# Patient Record
Sex: MALE | Race: WHITE | NOT HISPANIC OR LATINO | Employment: FULL TIME | URBAN - METROPOLITAN AREA
[De-identification: names, ages, dates, MRNs, and addresses within clinical notes are randomized per-mention and may not be internally consistent; named-entity substitution may affect disease eponyms.]

---

## 2019-07-13 ENCOUNTER — HOSPITAL ENCOUNTER (EMERGENCY)
Facility: CLINIC | Age: 49
Discharge: HOME | End: 2019-07-13
Attending: EMERGENCY MEDICINE | Admitting: EMERGENCY MEDICINE
Payer: COMMERCIAL

## 2019-07-13 ENCOUNTER — APPOINTMENT (OUTPATIENT)
Dept: GENERAL RADIOLOGY | Facility: CLINIC | Age: 49
End: 2019-07-13
Attending: EMERGENCY MEDICINE
Payer: COMMERCIAL

## 2019-07-13 VITALS
HEIGHT: 69 IN | TEMPERATURE: 99.5 F | WEIGHT: 170 LBS | SYSTOLIC BLOOD PRESSURE: 122 MMHG | RESPIRATION RATE: 16 BRPM | HEART RATE: 90 BPM | OXYGEN SATURATION: 96 % | BODY MASS INDEX: 25.18 KG/M2 | DIASTOLIC BLOOD PRESSURE: 81 MMHG

## 2019-07-13 DIAGNOSIS — R07.89 CHEST DISCOMFORT: ICD-10-CM

## 2019-07-13 DIAGNOSIS — E87.6 HYPOKALEMIA: ICD-10-CM

## 2019-07-13 LAB
ANION GAP SERPL CALCULATED.3IONS-SCNC: 9 MMOL/L (ref 3–14)
BASOPHILS # BLD AUTO: 0 10E9/L (ref 0–0.2)
BASOPHILS NFR BLD AUTO: 0.2 %
BUN SERPL-MCNC: 16 MG/DL (ref 7–30)
CALCIUM SERPL-MCNC: 8.9 MG/DL (ref 8.5–10.1)
CHLORIDE SERPL-SCNC: 92 MMOL/L (ref 94–109)
CO2 SERPL-SCNC: 31 MMOL/L (ref 20–32)
CREAT SERPL-MCNC: 0.94 MG/DL (ref 0.66–1.25)
DIFFERENTIAL METHOD BLD: ABNORMAL
EOSINOPHIL # BLD AUTO: 0 10E9/L (ref 0–0.7)
EOSINOPHIL NFR BLD AUTO: 0.3 %
ERYTHROCYTE [DISTWIDTH] IN BLOOD BY AUTOMATED COUNT: 12.1 % (ref 10–15)
GFR SERPL CREATININE-BSD FRML MDRD: >90 ML/MIN/{1.73_M2}
GLUCOSE SERPL-MCNC: 132 MG/DL (ref 70–99)
HCT VFR BLD AUTO: 43 % (ref 40–53)
HGB BLD-MCNC: 15.3 G/DL (ref 13.3–17.7)
IMM GRANULOCYTES # BLD: 0.1 10E9/L (ref 0–0.4)
IMM GRANULOCYTES NFR BLD: 0.6 %
INTERPRETATION ECG - MUSE: NORMAL
LYMPHOCYTES # BLD AUTO: 1.4 10E9/L (ref 0.8–5.3)
LYMPHOCYTES NFR BLD AUTO: 11.2 %
MCH RBC QN AUTO: 35.1 PG (ref 26.5–33)
MCHC RBC AUTO-ENTMCNC: 35.6 G/DL (ref 31.5–36.5)
MCV RBC AUTO: 99 FL (ref 78–100)
MONOCYTES # BLD AUTO: 0.8 10E9/L (ref 0–1.3)
MONOCYTES NFR BLD AUTO: 6.9 %
NEUTROPHILS # BLD AUTO: 9.8 10E9/L (ref 1.6–8.3)
NEUTROPHILS NFR BLD AUTO: 80.8 %
NRBC # BLD AUTO: 0 10*3/UL
NRBC BLD AUTO-RTO: 0 /100
PLATELET # BLD AUTO: 155 10E9/L (ref 150–450)
POTASSIUM SERPL-SCNC: 2.9 MMOL/L (ref 3.4–5.3)
RBC # BLD AUTO: 4.36 10E12/L (ref 4.4–5.9)
SODIUM SERPL-SCNC: 132 MMOL/L (ref 133–144)
TROPONIN I SERPL-MCNC: <0.015 UG/L (ref 0–0.04)
WBC # BLD AUTO: 12.1 10E9/L (ref 4–11)

## 2019-07-13 PROCEDURE — 71046 X-RAY EXAM CHEST 2 VIEWS: CPT

## 2019-07-13 PROCEDURE — 85025 COMPLETE CBC W/AUTO DIFF WBC: CPT | Performed by: EMERGENCY MEDICINE

## 2019-07-13 PROCEDURE — 25000128 H RX IP 250 OP 636: Performed by: EMERGENCY MEDICINE

## 2019-07-13 PROCEDURE — 80048 BASIC METABOLIC PNL TOTAL CA: CPT | Performed by: EMERGENCY MEDICINE

## 2019-07-13 PROCEDURE — 84484 ASSAY OF TROPONIN QUANT: CPT | Performed by: EMERGENCY MEDICINE

## 2019-07-13 PROCEDURE — 25000132 ZZH RX MED GY IP 250 OP 250 PS 637: Performed by: EMERGENCY MEDICINE

## 2019-07-13 PROCEDURE — 93005 ELECTROCARDIOGRAM TRACING: CPT

## 2019-07-13 PROCEDURE — 99285 EMERGENCY DEPT VISIT HI MDM: CPT | Mod: 25

## 2019-07-13 PROCEDURE — 96365 THER/PROPH/DIAG IV INF INIT: CPT

## 2019-07-13 RX ORDER — POTASSIUM CHLORIDE 1500 MG/1
40 TABLET, EXTENDED RELEASE ORAL ONCE
Status: COMPLETED | OUTPATIENT
Start: 2019-07-13 | End: 2019-07-13

## 2019-07-13 RX ORDER — MAGNESIUM SULFATE HEPTAHYDRATE 40 MG/ML
2 INJECTION, SOLUTION INTRAVENOUS ONCE
Status: COMPLETED | OUTPATIENT
Start: 2019-07-13 | End: 2019-07-13

## 2019-07-13 RX ADMIN — POTASSIUM CHLORIDE 40 MEQ: 1500 TABLET, EXTENDED RELEASE ORAL at 16:29

## 2019-07-13 RX ADMIN — MAGNESIUM SULFATE HEPTAHYDRATE 2 G: 40 INJECTION, SOLUTION INTRAVENOUS at 16:44

## 2019-07-13 ASSESSMENT — ENCOUNTER SYMPTOMS
BACK PAIN: 1
ARTHRALGIAS: 1
WEAKNESS: 1
DIARRHEA: 0
ABDOMINAL PAIN: 0
VOMITING: 1

## 2019-07-13 ASSESSMENT — MIFFLIN-ST. JEOR: SCORE: 1621.49

## 2019-07-13 NOTE — ED PROVIDER NOTES
"  History     Chief Complaint:  Arm and back pain    HPI     Phong Atkins is a 48 year old male who presents to the emergency department for evaluation of left arm and back pain. The patient reports that last night he felt an abnormal \"zapping\" sensation in his chest near his heart. He then reported having an abnormal left arm sensation. The next morning he reports feeling arm and leg weakness that progressed throughout the day while packing for a trip. He also reports feeling upper back pain, pain near his left armpit, and chest discomfort. Of note the patient reports drinking beginning on  7/6 and lasting through 7/9. On 7/10, he reports vomiting frequently, feeling shaky and having a decreased appetite. He currently denies any abdominal pain or diarrhea.    Cardiac/PE/DVT Risk Factors:  History of hypertension - negative  History of hyperlipidemia - negative  History of diabetes - negative  History of smoking - negative  Personal history of PE/DVT - negative  Family history of PE/DVT - negative  Family history of heart complications - Yes, father   Recent travel - negative  Recent surgery - negative  Other immobilizations - negative  Cancer - negative    Allergies:  No Known Drug Allergies    Medications:    The patient is not currently taking any prescribed medications.    Past Medical History:    The patient denies any significant past medical history.     Past Surgical History:    The patient does not have any pertinent past surgical history.    Family History:    No past pertinent family history.    Social History:  The patient was accompanied to the ED by his children.  Smoking Status: Not on file  Alcohol Use: Not on file  Drug Use: Not one file    Review of Systems   Gastrointestinal: Positive for vomiting. Negative for abdominal pain and diarrhea.   Musculoskeletal: Positive for arthralgias (left arm) and back pain.   Neurological: Positive for weakness.   All other systems reviewed and are " "negative.      Physical Exam   Vitals:  Patient Vitals for the past 24 hrs:   BP Temp Temp src Pulse Resp SpO2 Height Weight   07/13/19 1631 122/81 -- -- 90 -- 96 % -- --   07/13/19 1600 -- -- -- 86 16 99 % -- --   07/13/19 1445 -- -- -- -- -- 98 % -- --   07/13/19 1444 (!) 141/97 99.5  F (37.5  C) Oral 126 16 -- 1.753 m (5' 9\") 77.1 kg (170 lb)     Physical Exam   General: Alert and cooperative with exam. Patient in mild distress. Normal mentation. Anxious on exam  Head:  Scalp is NC/AT  Eyes:  No scleral icterus, PERRL  ENT:  The external nose and ears are normal. The oropharynx is normal and without erythema; mucus membranes are moist. Uvula midline, no evidence of deep space infection.  Neck:  Normal range of motion without rigidity.  CV:  Regular rate and rhythm    No pathologic murmur   Resp:  Breath sounds are clear bilaterally    Non-labored, no retractions or accessory muscle use  GI:  Abdomen is soft, no distension, no tenderness. No peritoneal signs  MS:  No lower extremity edema. Left upper extremity CMS intact, no pain to palpation.  Skin:  Warm and dry, No rash or lesions noted.  Neuro: Oriented x 3. No gross motor deficits.    Emergency Department Course   ECG:  Completed at 1448.  Read at 1539.   Rate 115 bpm. CT interval 170. QRS duration 104. QT/QTc 332/459. P-R-T axes 42 -67 37.  Sinus tachycardia with occasional premature ventricular complexes  Left anterior fascicular block  Abnormal ECG    Imaging:  Radiographic findings were communicated with the patient who voiced understanding of the findings.    XR Chest, PA & LAT  No acute disease.  Reading per radiology.    Laboratory:  CBC: WBC 12.1 (H) o/w AWNL (HGB 15.3, )  BMP:  (L) Potassium 2.9 (L) Glucose 132 (H) o/w AWNL (Creatinine 0.94)    Troponin (Collected 1530): 0.015    Interventions:  1629 Potassium chloride 40 mEq PO  1644 Magnesium sulfate 2 g IV    Emergency Department Course:  Nursing notes and vitals reviewed. 1540 I " performed an exam of the patient as documented above.     IV inserted. Medicine administered as documented above. Blood drawn. This was sent to the lab for further testing, results above.    The patient was sent for a chest x-ray while in the emergency department, findings above.     1722 I rechecked the patient and discussed the results of his workup thus far.     Findings and plan explained to the Patient. Patient discharged home with instructions regarding supportive care, medications, and reasons to return. The importance of close follow-up was reviewed.     I personally reviewed the laboratory results with the Patient and answered all related questions prior to discharge.    Impression & Plan      Medical Decision Making:  Patient is a 48-year-old male presents with episode of chest discomfort and recent drinking binge with multiple episodes of emesis (now resolved).  Patient's medical history and records were reviewed.  Initial consideration for, not limited to, atypical ACS/MI, esophageal spasm/GERD, MSK pain, electrolyte abnormality, anxiety, esophageal pathology, among others.  Labs, EKG, and imaging was obtained.  Chest x-ray unremarkable.  EKG demonstrates mild sinus tachycardia which resolved with administration of IV fluids; likely element of dehydration as well as anxiety.  Labs notable for hypokalemia (potassium 2.9; provided oral potassium replacement as well as empiric magnesium replacement).  Patient was offered but deferred resources for alcohol abuse.  Patient's symptoms are likely secondary to hypokalemia, possible Etoh withdrawal (now resolved), and anxiety.  Recommended close follow-up with PCP for recheck potassium.  Recommended dietary supplementation.  Return precautions discussed.  Patient discharged home.  Time discharge patient was hemodynamically stable, neurologic intact, afebrile, and asymptomatic.    Diagnosis:    ICD-10-CM    1. Hypokalemia E87.6    2. Chest discomfort R07.89         Disposition:  discharged to home    I, Julio Jean, am serving as a scribe on 7/13/2019 at 4:10 PM to personally document services performed by Ermias Tracey DO based on my observations and the provider's statements to me.       Julio Jean  7/13/2019    EMERGENCY DEPARTMENT       Ermias Tracey DO  07/15/19 0059

## 2019-07-13 NOTE — ED TRIAGE NOTES
Pt reports left arm and back pain starting yesterday. Pt reports that he feel generalized weakness.

## 2020-03-11 ENCOUNTER — HEALTH MAINTENANCE LETTER (OUTPATIENT)
Age: 50
End: 2020-03-11

## 2020-03-17 ENCOUNTER — OFFICE VISIT (OUTPATIENT)
Dept: URGENT CARE | Facility: URGENT CARE | Age: 50
End: 2020-03-17
Payer: COMMERCIAL

## 2020-03-17 VITALS
DIASTOLIC BLOOD PRESSURE: 87 MMHG | HEART RATE: 124 BPM | SYSTOLIC BLOOD PRESSURE: 121 MMHG | TEMPERATURE: 97.1 F | WEIGHT: 147 LBS | OXYGEN SATURATION: 98 % | BODY MASS INDEX: 21.71 KG/M2

## 2020-03-17 DIAGNOSIS — R10.13 ABDOMINAL PAIN, EPIGASTRIC: Primary | ICD-10-CM

## 2020-03-17 DIAGNOSIS — K29.00 ACUTE GASTRITIS WITHOUT HEMORRHAGE, UNSPECIFIED GASTRITIS TYPE: ICD-10-CM

## 2020-03-17 LAB
BASOPHILS # BLD AUTO: 0 10E9/L (ref 0–0.2)
BASOPHILS NFR BLD AUTO: 0.3 %
DIFFERENTIAL METHOD BLD: ABNORMAL
EOSINOPHIL # BLD AUTO: 0.1 10E9/L (ref 0–0.7)
EOSINOPHIL NFR BLD AUTO: 0.9 %
ERYTHROCYTE [DISTWIDTH] IN BLOOD BY AUTOMATED COUNT: 12.8 % (ref 10–15)
HCT VFR BLD AUTO: 42 % (ref 40–53)
HGB BLD-MCNC: 14.1 G/DL (ref 13.3–17.7)
LYMPHOCYTES # BLD AUTO: 1.8 10E9/L (ref 0.8–5.3)
LYMPHOCYTES NFR BLD AUTO: 16.2 %
MCH RBC QN AUTO: 33.7 PG (ref 26.5–33)
MCHC RBC AUTO-ENTMCNC: 33.6 G/DL (ref 31.5–36.5)
MCV RBC AUTO: 100 FL (ref 78–100)
MONOCYTES # BLD AUTO: 1.7 10E9/L (ref 0–1.3)
MONOCYTES NFR BLD AUTO: 16 %
NEUTROPHILS # BLD AUTO: 7.3 10E9/L (ref 1.6–8.3)
NEUTROPHILS NFR BLD AUTO: 66.6 %
PLATELET # BLD AUTO: 133 10E9/L (ref 150–450)
RBC # BLD AUTO: 4.19 10E12/L (ref 4.4–5.9)
WBC # BLD AUTO: 10.9 10E9/L (ref 4–11)

## 2020-03-17 PROCEDURE — 82150 ASSAY OF AMYLASE: CPT | Performed by: NURSE PRACTITIONER

## 2020-03-17 PROCEDURE — 36415 COLL VENOUS BLD VENIPUNCTURE: CPT | Performed by: NURSE PRACTITIONER

## 2020-03-17 PROCEDURE — 99203 OFFICE O/P NEW LOW 30 MIN: CPT | Performed by: NURSE PRACTITIONER

## 2020-03-17 PROCEDURE — 80053 COMPREHEN METABOLIC PANEL: CPT | Performed by: NURSE PRACTITIONER

## 2020-03-17 PROCEDURE — 85025 COMPLETE CBC W/AUTO DIFF WBC: CPT | Performed by: NURSE PRACTITIONER

## 2020-03-17 RX ORDER — PANTOPRAZOLE SODIUM 40 MG/1
40 TABLET, DELAYED RELEASE ORAL DAILY
Qty: 30 TABLET | Refills: 0 | Status: SHIPPED | OUTPATIENT
Start: 2020-03-17 | End: 2020-04-16

## 2020-03-17 SDOH — HEALTH STABILITY: MENTAL HEALTH: HOW OFTEN DO YOU HAVE A DRINK CONTAINING ALCOHOL?: MONTHLY OR LESS

## 2020-03-17 ASSESSMENT — ENCOUNTER SYMPTOMS
NEUROLOGICAL NEGATIVE: 1
NAUSEA: 1
ABDOMINAL PAIN: 1
MUSCULOSKELETAL NEGATIVE: 1
RESPIRATORY NEGATIVE: 1
CONSTITUTIONAL NEGATIVE: 1
CARDIOVASCULAR NEGATIVE: 1

## 2020-03-17 NOTE — PATIENT INSTRUCTIONS
Patient Education     Gastritis or Ulcer, No Antibiotic Treatment    Gastritis is irritation and inflammation of the stomach lining. This means the lining is red and swollen. It can cause shallow sores in the stomach lining called erosions. An ulcer is a deeper open sore in the lining of the stomach. It may also occur in the first part of the small intestine (duodenum). The causes and symptoms of gastritis and ulcers are very similar.  Causes and risk factors for both problems can include:    Long-term use of nonsteroidal anti-inflammatory drugs (NSAIDs), such as aspirin and ibuprofen    H. pylori bacteria infection    Tobacco use    Alcohol use    Certain other conditions such as immune disorders, certain medicines (high-dose iron supplements) and street drugs (such as cocaine)  Symptoms for both problems can include:    Dull or burning pain in the upper part of the belly    Loss of appetite    Heartburn or upset stomach    Frequent burping    Bloated feeling    Nausea with or without vomiting  You likely had an evaluation to help find the exact cause and extent of your problem. This may have included a health history, exam, and certain tests.  Results showed that your problem is not due to H. pylori infection. For this reason, you don't need antibiotics as part of your treatment.  Whether your problem is gastritis or an ulcer, you will still need to take other medicines, however. You will also need to follow instructions to help reduce stomach irritation so your stomach can heal.   Home care    Take any medicines you re prescribed exactly as directed. Common medicines used to treat gastritis include:  ? Antacids. These help neutralize the normal acids in your stomach.  ? Proton pump inhibitors. These block your stomach from making any acid.  ? H2 blockers. These reduce the amount of acid your stomach makes.  ? Bismuth subsalicylate. This helps protect the lining of your stomach from acid.    Don't take  any NSAIDs during your treatment. If you take NSAID to help treat other health problems, tell your healthcare provider. He or she may need to adjust your medicine plan or change the dosage.    Don t use tobacco. Also don t drink alcohol. These products can increase the amount of acid your stomach makes. This can delay healing. It can also worsen symptoms.  Follow-up care  Follow up with your healthcare provider, or as advised. In some cases, more testing may be needed.  When to seek medical advice  Call your healthcare provider right away if any of these occur:    Fever of 100.4 F (38 C) or higher, or as directed by your healthcare provider    Stomach pain that worsens or moves to the lower right part of belly    Extreme fatigue    Weakness or dizziness    Continued weight loss    Frequent vomiting, blood in your vomit, or coffee ground-like substance in your vomit    Black, tarry, or bloody stools  Call 911  Call 911 if any of these occur:    Chest pain appears or worsens, or spreads to the back, neck, shoulder, or arm    Unusually fast heart rate    Trouble breathing or swallowing    Confusion    Extreme drowsiness or trouble waking up    Fainting    Large amounts of blood present in vomit or stool  Date Last Reviewed: 3/1/2018    7740-0327 [employer]. [address] [place] [address], [place] [address]. All rights reserved. This information is not intended as a substitute for professional medical care. Always follow your healthcare professional's instructions.

## 2020-03-17 NOTE — PROGRESS NOTES
HPI  Phong Atkins 49 year old presents to the urgent care with intermittent epigastric pain.  He reports that he went on a drinking binge last week and had his last drink on Thursday which is 5 days ago.  He reports that since that time it has been painful and when he drinks it also activates that painful symptoms in his epigastrium.  He had one episode of vomiting last Thursday but none since.  He has been having normal bowel movements and has not noted that they were particularly dark or that he had any matthew blood.  He has started taking Mylanta and finds that that affords him some relief from his symptoms.  He denies that he had withdrawal symptoms after this episode of drinking but has not resumed solid food and has been just taking shakes as these are the easiest to digest.    Review of Systems   Constitutional: Negative.    HENT: Negative.    Respiratory: Negative.    Cardiovascular: Negative.    Gastrointestinal: Positive for abdominal pain and nausea.   Musculoskeletal: Negative.    Skin: Negative.    Neurological: Negative.    Endo/Heme/Allergies: Negative.       Past Medical History:   Diagnosis Date     Alcohol abuse      There is no problem list on file for this patient.     History reviewed. No pertinent surgical history.  No Known Allergies  Current Outpatient Medications   Medication     pantoprazole (PROTONIX) 40 MG EC tablet     No current facility-administered medications for this visit.          Physical Exam  Vitals signs and nursing note reviewed.   Constitutional:       General: He is not in acute distress.     Comments: Blood pressure 121/87, pulse 124, temperature 97.1  F (36.2  C), temperature source Oral, weight 66.7 kg (147 lb), SpO2 98 %.  s   HENT:      Head: Normocephalic.      Mouth/Throat:      Mouth: Mucous membranes are moist.   Eyes:      General: No scleral icterus.     Conjunctiva/sclera: Conjunctivae normal.   Neck:      Musculoskeletal: Normal range of motion.    Cardiovascular:      Rate and Rhythm: Normal rate.   Pulmonary:      Effort: Pulmonary effort is normal.      Breath sounds: Normal breath sounds.   Abdominal:      Palpations: There is no mass.      Tenderness: There is no abdominal tenderness. There is no right CVA tenderness, left CVA tenderness, guarding or rebound.      Hernia: No hernia is present.   Skin:     General: Skin is warm and dry.      Capillary Refill: Capillary refill takes less than 2 seconds.   Neurological:      Mental Status: He is alert and oriented to person, place, and time.   Psychiatric:         Behavior: Behavior normal.       Results for orders placed or performed in visit on 03/17/20   CBC with platelets and differential     Status: Abnormal   Result Value Ref Range    WBC 10.9 4.0 - 11.0 10e9/L    RBC Count 4.19 (L) 4.4 - 5.9 10e12/L    Hemoglobin 14.1 13.3 - 17.7 g/dL    Hematocrit 42.0 40.0 - 53.0 %     78 - 100 fl    MCH 33.7 (H) 26.5 - 33.0 pg    MCHC 33.6 31.5 - 36.5 g/dL    RDW 12.8 10.0 - 15.0 %    Platelet Count 133 (L) 150 - 450 10e9/L    % Neutrophils 66.6 %    % Lymphocytes 16.2 %    % Monocytes 16.0 %    % Eosinophils 0.9 %    % Basophils 0.3 %    Absolute Neutrophil 7.3 1.6 - 8.3 10e9/L    Absolute Lymphocytes 1.8 0.8 - 5.3 10e9/L    Absolute Monocytes 1.7 (H) 0.0 - 1.3 10e9/L    Absolute Eosinophils 0.1 0.0 - 0.7 10e9/L    Absolute Basophils 0.0 0.0 - 0.2 10e9/L    Diff Method Automated Method      Assessment:  1. Abdominal pain, epigastric    2. Acute gastritis without hemorrhage, unspecified gastritis type        Plan:  Orders Placed This Encounter     CBC with platelets and differential     Comprehensive metabolic panel (BMP + Alb, Alk Phos, ALT, AST, Total. Bili, TP)     Amylase     pantoprazole (PROTONIX) 40 MG EC tablet   CMP and Amylase pending, Differential is pending  No NSAIDs  Maalox liquid 30 ml QID, start Protonix today  Frequent small meals, reintroduce carbohydrates to the diet  Instructions  regarding self-care of patient/child reviewed.   Written instructions provided in after visit summary and reviewed.  Patient instructed to see primary care provider for new or persistent symptoms.   Red flag symptoms reviewed and patient has been instructed to seek   emergent care  Please contact pharmacy for medication questions.  Patient instructed to take medications as directed on package.      Breann Joshi, DNP, APRN, CNP

## 2020-03-18 LAB
ALBUMIN SERPL-MCNC: 3.7 G/DL (ref 3.4–5)
ALP SERPL-CCNC: 87 U/L (ref 40–150)
ALT SERPL W P-5'-P-CCNC: 45 U/L (ref 0–70)
AMYLASE SERPL-CCNC: 43 U/L (ref 30–110)
ANION GAP SERPL CALCULATED.3IONS-SCNC: 6 MMOL/L (ref 3–14)
AST SERPL W P-5'-P-CCNC: 31 U/L (ref 0–45)
BILIRUB SERPL-MCNC: 0.9 MG/DL (ref 0.2–1.3)
BUN SERPL-MCNC: 27 MG/DL (ref 7–30)
CALCIUM SERPL-MCNC: 9.1 MG/DL (ref 8.5–10.1)
CHLORIDE SERPL-SCNC: 101 MMOL/L (ref 94–109)
CO2 SERPL-SCNC: 26 MMOL/L (ref 20–32)
CREAT SERPL-MCNC: 1.18 MG/DL (ref 0.66–1.25)
GFR SERPL CREATININE-BSD FRML MDRD: 71 ML/MIN/{1.73_M2}
GLUCOSE SERPL-MCNC: 111 MG/DL (ref 70–99)
POTASSIUM SERPL-SCNC: 3.9 MMOL/L (ref 3.4–5.3)
PROT SERPL-MCNC: 7.8 G/DL (ref 6.8–8.8)
SODIUM SERPL-SCNC: 133 MMOL/L (ref 133–144)

## 2021-01-03 ENCOUNTER — HEALTH MAINTENANCE LETTER (OUTPATIENT)
Age: 51
End: 2021-01-03

## 2021-04-25 ENCOUNTER — HEALTH MAINTENANCE LETTER (OUTPATIENT)
Age: 51
End: 2021-04-25

## 2021-10-10 ENCOUNTER — HEALTH MAINTENANCE LETTER (OUTPATIENT)
Age: 51
End: 2021-10-10

## 2022-05-21 ENCOUNTER — HEALTH MAINTENANCE LETTER (OUTPATIENT)
Age: 52
End: 2022-05-21

## 2022-09-18 ENCOUNTER — HEALTH MAINTENANCE LETTER (OUTPATIENT)
Age: 52
End: 2022-09-18

## 2023-06-04 ENCOUNTER — HEALTH MAINTENANCE LETTER (OUTPATIENT)
Age: 53
End: 2023-06-04

## 2024-10-15 ENCOUNTER — HOSPITAL ENCOUNTER (EMERGENCY)
Facility: CLINIC | Age: 54
Discharge: HOME | End: 2024-10-15
Attending: EMERGENCY MEDICINE | Admitting: EMERGENCY MEDICINE
Payer: COMMERCIAL

## 2024-10-15 VITALS
DIASTOLIC BLOOD PRESSURE: 77 MMHG | OXYGEN SATURATION: 97 % | HEART RATE: 96 BPM | TEMPERATURE: 97.4 F | RESPIRATION RATE: 15 BRPM | SYSTOLIC BLOOD PRESSURE: 120 MMHG

## 2024-10-15 DIAGNOSIS — F10.920 ALCOHOLIC INTOXICATION WITHOUT COMPLICATION (H): ICD-10-CM

## 2024-10-15 DIAGNOSIS — F10.10 ALCOHOL ABUSE: ICD-10-CM

## 2024-10-15 PROBLEM — F10.929 ALCOHOL INTOXICATION (H): Status: ACTIVE | Noted: 2024-10-15

## 2024-10-15 LAB
ALBUMIN SERPL BCG-MCNC: 4.4 G/DL (ref 3.5–5.2)
ALBUMIN UR-MCNC: NEGATIVE MG/DL
ALP SERPL-CCNC: 65 U/L (ref 40–150)
ALT SERPL W P-5'-P-CCNC: 49 U/L (ref 0–70)
AMPHETAMINES UR QL SCN: NORMAL
ANION GAP SERPL CALCULATED.3IONS-SCNC: 15 MMOL/L (ref 7–15)
APPEARANCE UR: CLEAR
AST SERPL W P-5'-P-CCNC: 86 U/L (ref 0–45)
BARBITURATES UR QL SCN: NORMAL
BASOPHILS # BLD AUTO: 0 10E3/UL (ref 0–0.2)
BASOPHILS NFR BLD AUTO: 1 %
BENZODIAZ UR QL SCN: NORMAL
BILIRUB SERPL-MCNC: 2 MG/DL
BILIRUB UR QL STRIP: NEGATIVE
BUN SERPL-MCNC: 15.9 MG/DL (ref 6–20)
BZE UR QL SCN: NORMAL
CALCIUM SERPL-MCNC: 10 MG/DL (ref 8.8–10.4)
CANNABINOIDS UR QL SCN: NORMAL
CHLORIDE SERPL-SCNC: 103 MMOL/L (ref 98–107)
COLOR UR AUTO: NORMAL
CREAT SERPL-MCNC: 1.07 MG/DL (ref 0.67–1.17)
EGFRCR SERPLBLD CKD-EPI 2021: 81 ML/MIN/1.73M2
EOSINOPHIL # BLD AUTO: 0.1 10E3/UL (ref 0–0.7)
EOSINOPHIL NFR BLD AUTO: 1 %
ERYTHROCYTE [DISTWIDTH] IN BLOOD BY AUTOMATED COUNT: 12.8 % (ref 10–15)
ETHANOL SERPL-MCNC: 0.38 G/DL
FENTANYL UR QL: NORMAL
GLUCOSE SERPL-MCNC: 124 MG/DL (ref 70–99)
GLUCOSE UR STRIP-MCNC: NEGATIVE MG/DL
HCO3 SERPL-SCNC: 28 MMOL/L (ref 22–29)
HCT VFR BLD AUTO: 44.2 % (ref 40–53)
HGB BLD-MCNC: 15.2 G/DL (ref 13.3–17.7)
HGB UR QL STRIP: NEGATIVE
HOLD SPECIMEN: NORMAL
IMM GRANULOCYTES # BLD: 0 10E3/UL
IMM GRANULOCYTES NFR BLD: 0 %
INR PPP: 1.06 (ref 0.85–1.15)
KETONES UR STRIP-MCNC: NEGATIVE MG/DL
LEUKOCYTE ESTERASE UR QL STRIP: NEGATIVE
LYMPHOCYTES # BLD AUTO: 2.6 10E3/UL (ref 0.8–5.3)
LYMPHOCYTES NFR BLD AUTO: 37 %
MAGNESIUM SERPL-MCNC: 2.2 MG/DL (ref 1.7–2.3)
MCH RBC QN AUTO: 33 PG (ref 26.5–33)
MCHC RBC AUTO-ENTMCNC: 34.4 G/DL (ref 31.5–36.5)
MCV RBC AUTO: 96 FL (ref 78–100)
MONOCYTES # BLD AUTO: 0.7 10E3/UL (ref 0–1.3)
MONOCYTES NFR BLD AUTO: 10 %
NEUTROPHILS # BLD AUTO: 3.6 10E3/UL (ref 1.6–8.3)
NEUTROPHILS NFR BLD AUTO: 51 %
NITRATE UR QL: NEGATIVE
NRBC # BLD AUTO: 0 10E3/UL
NRBC BLD AUTO-RTO: 0 /100
OPIATES UR QL SCN: NORMAL
PCP QUAL URINE (ROCHE): NORMAL
PH UR STRIP: 6.5 [PH] (ref 5–7)
PLATELET # BLD AUTO: 262 10E3/UL (ref 150–450)
POTASSIUM SERPL-SCNC: 3.8 MMOL/L (ref 3.4–5.3)
PROT SERPL-MCNC: 7 G/DL (ref 6.4–8.3)
RBC # BLD AUTO: 4.61 10E6/UL (ref 4.4–5.9)
RBC URINE: <1 /HPF
SODIUM SERPL-SCNC: 146 MMOL/L (ref 135–145)
SP GR UR STRIP: 1.01 (ref 1–1.03)
TRANSITIONAL EPI: <1 /HPF
UROBILINOGEN UR STRIP-MCNC: NORMAL MG/DL
WBC # BLD AUTO: 7 10E3/UL (ref 4–11)
WBC URINE: <1 /HPF

## 2024-10-15 PROCEDURE — 36415 COLL VENOUS BLD VENIPUNCTURE: CPT | Performed by: EMERGENCY MEDICINE

## 2024-10-15 PROCEDURE — 85025 COMPLETE CBC W/AUTO DIFF WBC: CPT | Performed by: EMERGENCY MEDICINE

## 2024-10-15 PROCEDURE — 250N000013 HC RX MED GY IP 250 OP 250 PS 637: Performed by: EMERGENCY MEDICINE

## 2024-10-15 PROCEDURE — 85610 PROTHROMBIN TIME: CPT | Performed by: EMERGENCY MEDICINE

## 2024-10-15 PROCEDURE — 80307 DRUG TEST PRSMV CHEM ANLYZR: CPT | Performed by: EMERGENCY MEDICINE

## 2024-10-15 PROCEDURE — 81001 URINALYSIS AUTO W/SCOPE: CPT | Performed by: EMERGENCY MEDICINE

## 2024-10-15 PROCEDURE — 82077 ASSAY SPEC XCP UR&BREATH IA: CPT | Performed by: EMERGENCY MEDICINE

## 2024-10-15 PROCEDURE — 99283 EMERGENCY DEPT VISIT LOW MDM: CPT

## 2024-10-15 PROCEDURE — 83735 ASSAY OF MAGNESIUM: CPT | Performed by: EMERGENCY MEDICINE

## 2024-10-15 PROCEDURE — 84075 ASSAY ALKALINE PHOSPHATASE: CPT | Performed by: EMERGENCY MEDICINE

## 2024-10-15 RX ORDER — FOLIC ACID 1 MG/1
1 TABLET ORAL ONCE
Status: COMPLETED | OUTPATIENT
Start: 2024-10-15 | End: 2024-10-15

## 2024-10-15 RX ORDER — MULTIVITAMIN,THERAPEUTIC
1 TABLET ORAL ONCE
Status: COMPLETED | OUTPATIENT
Start: 2024-10-15 | End: 2024-10-15

## 2024-10-15 RX ORDER — MAGNESIUM OXIDE 400 MG/1
800 TABLET ORAL ONCE
Status: COMPLETED | OUTPATIENT
Start: 2024-10-15 | End: 2024-10-15

## 2024-10-15 RX ADMIN — MULTIVITAMIN TABLET 1 TABLET: TABLET at 13:36

## 2024-10-15 RX ADMIN — FOLIC ACID 1 MG: 1 TABLET ORAL at 13:37

## 2024-10-15 RX ADMIN — Medication 800 MG: at 13:36

## 2024-10-15 RX ADMIN — THIAMINE HCL TAB 100 MG 100 MG: 100 TAB at 13:37

## 2024-10-15 ASSESSMENT — ACTIVITIES OF DAILY LIVING (ADL)
ADLS_ACUITY_SCORE: 35

## 2024-10-15 ASSESSMENT — COLUMBIA-SUICIDE SEVERITY RATING SCALE - C-SSRS
2. HAVE YOU ACTUALLY HAD ANY THOUGHTS OF KILLING YOURSELF IN THE PAST MONTH?: NO
6. HAVE YOU EVER DONE ANYTHING, STARTED TO DO ANYTHING, OR PREPARED TO DO ANYTHING TO END YOUR LIFE?: NO
1. IN THE PAST MONTH, HAVE YOU WISHED YOU WERE DEAD OR WISHED YOU COULD GO TO SLEEP AND NOT WAKE UP?: NO

## 2024-10-15 NOTE — PLAN OF CARE
Phong Atkins  October 15, 2024  Plan of Care Hand-off Note     Patient Care Path: observation    Plan for Care:   Pt is recommended for observation and detox. Pt presents to ED with family due to concerns about substance use (alcohol) and intoxication (BAL .38.) Pt's son found him this morning when he was yelling and very intoxicated; at the time, pt was non verbal and inebriated. Pt's son, with the help of his friend, got pt into a car and brought him to ED. Pt denies SI, SIB and HI. Pt denies hx of suicidal attempts/behaviors. Pt denies AVH and other psychotic sx. Pt and provider are agreeable to observation in ED to support pt in clearing from alcohol. At time of DEC, pt is agreeable to stay at hospital to support detox. Pt would benefit from VEE assessment but needs to clear further to engage in assessment. [facility] will reass[facility] tomorrow to determine appropriate care path.      Overview:  -Pt will remain on observation while sobering up/detoxing  -Pt reports wanting to get help for drinking and may benefit from VEE assessment but pt was unable to agree at time of DEC (no VEE order placed at this time.)            Legal Status: Legal Status at Admission: Voluntary/Patient has signed consent for treatment         Updated regarding plan of care.           Bakari Moe

## 2024-10-15 NOTE — CONSULTS
Diagnostic Evaluation Consultation  Crisis Assessment    Patient Name: Phong Atkins  Age:  54 year old  Legal Sex: male  Gender Identity: male  Pronouns: he/him  Race: White  Ethnicity: Not  or   Language: English      Patient was assessed: In person   Crisis Assessment Start Date: 10/15/24  Crisis Assessment Start Time: 1318  Crisis Assessment Stop Time: 1408  Patient location: [facility] EMERGENCY DEPT                             ED19    Referral Data and Chief Complaint  Phong Atkins presents to the ED with family/friends. Patient is presenting to the ED for the following concerns: Intoxication, Substance use.   Factors that make the mental health crisis life threatening or complex are:  Pt presents to ED with family due to concerns about substance use (alcohol) and intoxication (BAL .38.) Pt's son found him this morning when he was yelling and very intoxicated. Pt's son, with the help of his friend, got pt into a car and brought him to ED. Pt denies SI, SIB and HI. Pt denies hx of suicidal attempts/behaviors. Pt denies AVH and other psychotic sx..      Informed Consent and Assessment Methods  Explained the crisis assessment process, including applicable information disclosures and limits to confidentiality, assessed understanding of the process, and obtained consent to proceed with the assessment.  Assessment methods included conducting a formal interview with patient, review of medical records, collaboration with medical staff, and obtaining relevant collateral information from family and community providers when available.  : done           History of the Crisis   Pt is a 54 year old male that presents for DEC assessment as labile and cooperative. Pt is oriented to place but not time or date. Pt presents as labile, tearful, and, at times, oppositional. Pt is still under the influence of alcohol. Pt presented to ED with family due to concerns about substance use (alcohol) and  "intoxication (BAL .38.) Pt's son found him this morning when he was yelling and very intoxicated. Pt's son, with the help of his friend, got pt into a car and brought him to ED. Pt denies SI, SIB and HI. Pt denies hx of suicidal attempts/behaviors. Pt denies AVH and other psychotic sx. Pt has hx of alcohol dependency concerns with 2 prior admissions for CD tx. Pt denies MH sx and hx of MH concerns. Pt reports drinking vodka daily but cannot specify how much he drinks. At first, pt stated \"I last drank 5 minutes ago\" but upon redirection stated he drank last at 4AM. Pt denies other substance use concerns or hx of using other drugs. Pt denies hx of alcohol withdrawl sx; however, family detailed pt has experienced withdrawl sx in the past including sweating, shaking, nausea, and anxiety. Family denied hx of severe withdrawl sx with no reported hx of seizures. Pt denies current MH supports with no current OP therapy or OP psychiatry; pt reports working with an OP therapist 2 years ago. Pt denies hx of trauma/abuse. Pt reports family hx of depression and alcoholism. Pt reports functional impairment from alcohol including having to miss work.    Brief Psychosocial History  Family:  , Children yes  Support System:  Children, Friend  Employment Status:  employed full-time  Source of Income:  salary/wages  Financial Environmental Concerns:  none  Current Hobbies:     Barriers in Personal Life:  other (see comments) (ongoing alcohol use)    Significant Clinical History  Current Anxiety Symptoms:     Current Depression/Trauma:     Current Somatic Symptoms:     Current Psychosis/Thought Disturbance:     Current Eating Symptoms:     Chemical Use History:  Alcohol: Daily, Blackouts (pt drinks vodka daily)  Last Use:: 10/15/24  Benzodiazepines: None  Opiates: None  Cocaine: None  Marijuana: None  Other Use: None  Withdrawal Symptoms:  (none reported at time of DEC assessment)   Past diagnosis:  Substance Use " Disorder  Family history:  Depression, Substance Use Disorder  Past treatment:  Individual therapy, Residential Treatment, AA/NA  Details of most recent treatment:  Pt does not have recent tx hx  Other relevant history:  Pt has hx of 2 prior admissions for alcohol dependency tx       Collateral Information  Is there collateral information: Yes     Collateral information name, relationship, phone number:  Jean, son    What happened today: Jean found pt in his room screaming and highly intoxicated. Pt was having difficulty communicating at the time and was nearly non verbal. Jean tried to move his father but could not and called a friend to help get his dad into his car to bring him to ED.     What is different about patient's functioning: Pt has been drinking more lately but unsure how much he is actually drinking day to day.     Concern about alcohol/drug use:  very concerned about ongoing alcohol use    What do you think the patient needs:  CD tx    Has patient made comments about wanting to kill themselves/others: no    If d/c is recommended, can they take part in safety/aftercare planning:  yes    Additional collateral information:  Pt has hx of alcohol dependency concerns with 2 prior experiences of CD tx       Risk Assessment  [place] Suicide Severity Rating Scale Full Clinical Version:  Suicidal Ideation  Q1 Wish to be Dead (Lifetime): No  Q2 Non-Specific Active Suicidal Thoughts (Lifetime): No  Q6 Suicide Behavior (Lifetime): no     Suicidal Behavior (Lifetime)  Actual Attempt (Lifetime): No  Has subject engaged in non-suicidal self-injurious behavior? (Lifetime): No  Interrupted Attempts (Lifetime): No  Aborted or Self-Interrupted Attempt (Lifetime): No  Preparatory Acts or Behavior (Lifetime): No    [place] Suicide Severity Rating Scale Recent:   Suicidal Ideation (Recent)  Q1 Wished to be Dead (Past Month): no  Q2 Suicidal Thoughts (Past Month): no  Level of Risk per Screen: no risks indicated     Suicidal  Behavior (Recent)  Actual Attempt (Past 3 Months): No  Interrupted Attempts (Past 3 Months): No  Aborted or Self-Interrupted Attempt (Past 3 Months): No  Preparatory Acts or Behavior (Past 3 Months): No    Environmental or Psychosocial Events: loss of a relationship due to divorce/separation, work or task failure, ongoing abuse of substances  Protective Factors: Protective Factors: responsibilities and duties to others, including pets and children    Does the patient have thoughts of harming others? Feels Like Hurting Others: no  Previous Attempt to Hurt Others: no  Is the patient engaging in sexually inappropriate behavior?: no    Is the patient engaging in sexually inappropriate behavior?  no          Mental Status Exam   Affect: Dramatic, Labile  Appearance: Disheveled  Attention Span/Concentration: Inattentive  Eye Contact: Variable    Fund of Knowledge: Delayed   Language /Speech Content: Expressive Speech  Language /Speech Volume: Loud  Language /Speech Rate/Productions: Slow  Recent Memory: Poor  Remote Memory: Variable  Mood: Irritable  Orientation to Person: Yes   Orientation to Place: Yes  Orientation to Time of Day: No  Orientation to Date: No     Situation (Do they understand why they are here?): Yes  Psychomotor Behavior: Normal  Thought Content: Other (please comment) (intoxicated)  Thought Form: Tangential          Medication  Psychotropic medications:   Medication Orders - Psychiatric (From admission, onward)      None             Current Care Team  Patient Care Team:  Torsten Arellano MD as PCP - General (Family Practice)    Diagnosis  Patient Active Problem List   Diagnosis Code    Alcohol abuse F10.10    Alcohol intoxication (H) F10.929       Primary Problem This Admission  Active Hospital Problems    Alcohol intoxication (H)  F10.929      Alcohol abuse  F10.10        Clinical Summary and Substantiation of Recommendations   Pt is recommended for observation and detox. Pt presents to ED with  family due to concerns about substance use (alcohol) and intoxication (BAL .38.) Pt's son found him this morning when he was yelling and very intoxicated; at the time, pt was non verbal and inebriated. Pt's son, with the help of his friend, got pt into a car and brought him to ED. Pt denies SI, SIB and HI. Pt denies hx of suicidal attempts/behaviors. Pt denies AVH and other psychotic sx. Pt and provider are agreeable to observation in ED to support pt in clearing from alcohol. At time of DEC, pt is agreeable to stay at hospital to support detox. Pt may benefit from VEE assessment but needs to clear further to engage in assessment. LM[facility] will reassess pt tomorrow to determine appropriate care path.              Disposition  Recommended disposition: Detox, Observation, Rule 25/VEE Assessment        Reviewed case and recommendations with attending provider. Attending Name: Javier Vela MD       Attending concurs with disposition: yes       Patient and/or validated legal guardian concurs with disposition:   yes       Final disposition:  observation    Legal status on admission: Voluntary/Patient has signed consent for treatment    Assessment Details   Total duration spent with the patient: 50 min     CPT code(s) utilized: 25725 - Psychotherapy for Crisis - 60 (30-74*) min    Max GARY Moe Psychotherapist  DEC - Triage & Transition Services  Callback: [phone]

## 2024-10-15 NOTE — ED PROVIDER NOTES
"  Emergency Department Note      History of Present Illness     Chief Complaint   Alcohol Intoxication and Fall      HPI   Phong Atkins is a 54 year old male with a history of alcohol abuse who presents to the ED with his son for evaluation of alcohol intoxication. The patient's son states he heard his father yell this morning and went to check on him where he found him in bed, unable to answer questions, and brought him here. He reports a history of daily alcohol use and 2 previous admissions to treatment for alcoholism in the past. His most recent treatments was about 1 year ago and he is unsure how long he was sober for afterwards. Patient states he drank \"enough\" today but has not been drinking daily. He is amenable to sobriety. He denies recent head injury or loss of consciousness. He denies smoking or illicit drug use. He also denies chest pain, abdominal pain, shortness of breath, nausea, vomiting, diarrhea, fever, cough. He denies suicidal or homicidal ideation.     Independent Historian   Son as detailed above.    Review of External Notes   None    Past Medical History     Medical History and Problem List   Alcohol abuse    Medications   The patient is not currently taking any prescribed medications.    Physical Exam     Patient Vitals for the past 24 hrs:   BP Temp Temp src Pulse Resp SpO2   10/15/24 1529 129/87 -- -- 91 -- --   10/15/24 1254 (!) 133/92 97.4  F (36.3  C) Oral 88 18 97 %     Physical Exam  Nursing note and vitals reviewed.  Constitutional:  Awake and alert. Cooperative.  Somewhat disheveled and smells of alcohol.  HENT:   Nose:    Nose normal.   Mouth/Throat:   Mucous membranes are normal.   Eyes:    Conjunctivae normal and EOM are normal.      Pupils are equal, round, and reactive to light.   Neck:    Trachea normal.   Cardiovascular:  Normal rate, regular rhythm, normal heart sounds and normal pulses. No murmur heard.  Pulmonary/Chest:  Effort normal and breath sounds normal.   Abdominal: "   Soft. Normal appearance and bowel sounds are normal.      There is no tenderness.      There is no rebound and no CVA tenderness.   Musculoskeletal:  Extremities atraumatic x 4.   Lymphadenopathy:  No cervical adenopathy.   Neurological:   Awake and alert. Normal strength.      No cranial nerve deficit or sensory deficit. GCS eye subscore is 4. GCS verbal subscore is 5. GCS motor subscore is 6.   Skin:    Small contusion to the right superior periorbital region. No rash noted.   Psychiatric:   Tearful and intoxicated.  Denies any suicidal ideation.    Diagnostics     Lab Results   Labs Ordered and Resulted from Time of ED Arrival to Time of ED Departure   COMPREHENSIVE METABOLIC PANEL - Abnormal       Result Value    Sodium 146 (*)     Potassium 3.8      Carbon Dioxide (CO2) 28      Anion Gap 15      Urea Nitrogen 15.9      Creatinine 1.07      GFR Estimate 81      Calcium 10.0      Chloride 103      Glucose 124 (*)     Alkaline Phosphatase 65      AST 86 (*)     ALT 49      Protein Total 7.0      Albumin 4.4      Bilirubin Total 2.0 (*)    ETHYL ALCOHOL LEVEL - Abnormal    Alcohol ethyl 0.38 (*)    INR - Normal    INR 1.06     MAGNESIUM - Normal    Magnesium 2.2     ROUTINE UA WITH MICROSCOPIC REFLEX TO CULTURE - Normal    Color Urine Light Yellow      Appearance Urine Clear      Glucose Urine Negative      Bilirubin Urine Negative      Ketones Urine Negative      Specific Gravity Urine 1.006      Blood Urine Negative      pH Urine 6.5      Protein Albumin Urine Negative      Urobilinogen Urine Normal      Nitrite Urine Negative      Leukocyte Esterase Urine Negative      RBC Urine <1      WBC Urine <1      Transitional Epithelials Urine <1     URINE DRUG SCREEN PANEL - Normal    Amphetamines Urine Screen Negative      Barbituates Urine Screen Negative      Benzodiazepine Urine Screen Negative      Cannabinoids Urine Screen Negative      Cocaine Urine Screen Negative      Fentanyl Qual Urine Screen Negative       Opiates Urine Screen Negative      PCP Urine Screen Negative     CBC WITH PLATELETS AND DIFFERENTIAL    WBC Count 7.0      RBC Count 4.61      Hemoglobin 15.2      Hematocrit 44.2      MCV 96      MCH 33.0      MCHC 34.4      RDW 12.8      Platelet Count 262      % Neutrophils 51      % Lymphocytes 37      % Monocytes 10      % Eosinophils 1      % Basophils 1      % Immature Granulocytes 0      NRBCs per 100 WBC 0      Absolute Neutrophils 3.6      Absolute Lymphocytes 2.6      Absolute Monocytes 0.7      Absolute Eosinophils 0.1      Absolute Basophils 0.0      Absolute Immature Granulocytes 0.0      Absolute NRBCs 0.0         Imaging   No orders to display     Independent Interpretation   None    ED Course      Medications Administered   Medications   multivitamin, therapeutic (THERA-VIT) tablet 1 tablet (1 tablet Oral $Given 10/15/24 1336)   folic acid (FOLVITE) tablet 1 mg (1 mg Oral $Given 10/15/24 1337)   thiamine (B-1) tablet 100 mg (100 mg Oral $Given 10/15/24 1337)   magnesium oxide (MAG-OX) tablet 800 mg (800 mg Oral $Given 10/15/24 1336)       Procedures   Procedures     Discussion of Management   ED Mental Health, [place]    ED Course   ED Course as of 10/15/24 1721   e Oct 15, 2024   1252 I obtained history and performed a physical exam as noted above.        Additional Documentation  None    Medical Decision Making / Diagnosis     CMS Diagnoses: None    MIPS       None    [facility]   Phong Atkins is a 54 year old male who was brought in by his son due to alcohol abuse and intoxication.  I felt it was reasonable to check the above blood work and provide him a multivitamin.  I also feel that he would benefit from a mental health assessment, as he was quite tearful here as well.  His initial DEC assessment was unsuccessful although, as the patient was too intoxicated.  Therefore he will need to sober up further before undergoing a more extensive mental health assessment.  Therefore I will be signing him out to  my colleague, Dr. Moreno, who will be assuming his care.    Disposition   Care of the patient was transferred to my colleague Dr. Moreno.     Diagnosis     ICD-10-CM    1. Alcoholic intoxication without complication (H)  F10.920       2. Alcohol abuse  F10.10                    Scribe Disclosure:  I, Ayla Dominguez, am serving as a scribe at 12:46 PM on 10/15/2024 to document services personally performed by Javier Vela MD based on my observations and the provider's statements to me.        Javier Vela MD  10/15/24 [id number]

## 2024-10-16 ENCOUNTER — TELEPHONE (OUTPATIENT)
Dept: BEHAVIORAL HEALTH | Facility: CLINIC | Age: 54
End: 2024-10-16
Payer: COMMERCIAL

## 2024-10-16 NOTE — ED NOTES
Per DEC , pt has been evaluated and will not be re-assessed until tomorrow. Pt is not on a hold and would like to got home. MD made aware.

## 2024-10-16 NOTE — TELEPHONE ENCOUNTER
Unable to reach patient. Called mobile phone and mailbox was full. Called home phone and number is not in service. No other phone number for patient was available in [facility] at time of call. No further follow-up is needed.

## 2024-12-01 ENCOUNTER — HEALTH MAINTENANCE LETTER (OUTPATIENT)
Age: 54
End: 2024-12-01